# Patient Record
Sex: FEMALE | Race: WHITE | NOT HISPANIC OR LATINO
[De-identification: names, ages, dates, MRNs, and addresses within clinical notes are randomized per-mention and may not be internally consistent; named-entity substitution may affect disease eponyms.]

---

## 2021-08-02 ENCOUNTER — APPOINTMENT (OUTPATIENT)
Dept: GASTROENTEROLOGY | Facility: CLINIC | Age: 51
End: 2021-08-02

## 2023-07-11 PROBLEM — Z00.00 ENCOUNTER FOR PREVENTIVE HEALTH EXAMINATION: Status: ACTIVE | Noted: 2023-07-11

## 2023-07-18 ENCOUNTER — APPOINTMENT (OUTPATIENT)
Dept: ELECTROPHYSIOLOGY | Facility: CLINIC | Age: 53
End: 2023-07-18

## 2023-08-08 ENCOUNTER — APPOINTMENT (OUTPATIENT)
Dept: ELECTROPHYSIOLOGY | Facility: CLINIC | Age: 53
End: 2023-08-08
Payer: MEDICAID

## 2023-08-08 ENCOUNTER — NON-APPOINTMENT (OUTPATIENT)
Age: 53
End: 2023-08-08

## 2023-08-08 VITALS
HEART RATE: 75 BPM | DIASTOLIC BLOOD PRESSURE: 86 MMHG | HEIGHT: 63 IN | OXYGEN SATURATION: 99 % | SYSTOLIC BLOOD PRESSURE: 122 MMHG

## 2023-08-08 DIAGNOSIS — K21.9 GASTRO-ESOPHAGEAL REFLUX DISEASE W/OUT ESOPHAGITIS: ICD-10-CM

## 2023-08-08 DIAGNOSIS — Z78.9 OTHER SPECIFIED HEALTH STATUS: ICD-10-CM

## 2023-08-08 DIAGNOSIS — Z82.49 FAMILY HISTORY OF ISCHEMIC HEART DISEASE AND OTHER DISEASES OF THE CIRCULATORY SYSTEM: ICD-10-CM

## 2023-08-08 PROCEDURE — 99204 OFFICE O/P NEW MOD 45 MIN: CPT | Mod: 25

## 2023-08-08 PROCEDURE — 93000 ELECTROCARDIOGRAM COMPLETE: CPT

## 2023-08-08 RX ORDER — ASPIRIN 81 MG/1
81 TABLET, DELAYED RELEASE ORAL DAILY
Refills: 0 | Status: ACTIVE | COMMUNITY
Start: 2023-08-08

## 2023-08-08 RX ORDER — ZOLPIDEM TARTRATE 10 MG/1
10 TABLET, FILM COATED ORAL
Refills: 0 | Status: ACTIVE | COMMUNITY
Start: 2023-08-08

## 2023-08-08 RX ORDER — ROSUVASTATIN CALCIUM 20 MG/1
20 TABLET, FILM COATED ORAL DAILY
Qty: 90 | Refills: 3 | Status: ACTIVE | COMMUNITY
Start: 2023-08-08

## 2023-08-08 RX ORDER — FAMOTIDINE 40 MG/1
40 TABLET, FILM COATED ORAL
Refills: 0 | Status: ACTIVE | COMMUNITY
Start: 2023-08-08

## 2023-08-08 RX ORDER — PROPRANOLOL HYDROCHLORIDE 10 MG/1
10 TABLET ORAL
Refills: 0 | Status: DISCONTINUED | COMMUNITY
Start: 2023-08-08 | End: 2023-08-08

## 2023-08-08 NOTE — DISCUSSION/SUMMARY
[FreeTextEntry1] : 1. Palpitation: ECG performed today to check for arrhythmias, demonstrated PACs. Cardiac monitor also showed PACs.  With history of dizziness, would like to correlate with monitor if recurs.  Therefore 30-day monitor ordered. Patient had been treated with propranolol 10 mg 2 tablets twice daily for palpitations and migraines.  However, given palpitations and PACs, will try metoprolol 100 daily instead of propranolol. 30 day monitor also to ensure patient does not get too bradycardic on metoprolol and to check PAC frequency on treatment.   2. HTN: will use metoprolol for HTN for now.  [EKG obtained to assist in diagnosis and management of assessed problem(s)] : EKG obtained to assist in diagnosis and management of assessed problem(s)

## 2023-08-08 NOTE — HISTORY OF PRESENT ILLNESS
[FreeTextEntry1] : Ms. MARTELL ABAD is a 52-year woman with past medical history of HLD, GERD, HTN, who presents here for initial visit. Reports having palpitations for a while which gets more frequent now. She feels more when she eats, and when she lies in bed almost every day. She felt dizzy once about 3 months ago, did Cath in 5/26/2023 at [facility] and reportedly had nonobstructive CAD. Seen by Dr Quiles, wore Holter for 1 week and was told to follow up with EP. She was having palpitations while on the monitor which showed PACs. Echo was done in his office results not available now. Feeling well now. Denies chest pain, AGUILERA, syncope.

## 2023-08-22 ENCOUNTER — NON-APPOINTMENT (OUTPATIENT)
Age: 53
End: 2023-08-22

## 2023-08-22 ENCOUNTER — APPOINTMENT (OUTPATIENT)
Dept: ELECTROPHYSIOLOGY | Facility: CLINIC | Age: 53
End: 2023-08-22
Payer: MEDICAID

## 2023-08-22 VITALS
OXYGEN SATURATION: 97 % | BODY MASS INDEX: 26.93 KG/M2 | DIASTOLIC BLOOD PRESSURE: 86 MMHG | WEIGHT: 152 LBS | SYSTOLIC BLOOD PRESSURE: 128 MMHG | HEART RATE: 74 BPM | HEIGHT: 63 IN

## 2023-08-22 DIAGNOSIS — I10 ESSENTIAL (PRIMARY) HYPERTENSION: ICD-10-CM

## 2023-08-22 DIAGNOSIS — E78.5 HYPERLIPIDEMIA, UNSPECIFIED: ICD-10-CM

## 2023-08-22 PROCEDURE — 93000 ELECTROCARDIOGRAM COMPLETE: CPT

## 2023-08-22 PROCEDURE — 99213 OFFICE O/P EST LOW 20 MIN: CPT | Mod: 25

## 2023-08-22 NOTE — DISCUSSION/SUMMARY
[FreeTextEntry1] : 1. Palpitation: recurrent palpitations with noted APCs. On metoprolol. ECG performed today to assess for presence of conduction disease and reveals NSR with APC. Currently undergoing Holter monitor which has revealed apc's correlating with symptoms, no evidence of bradyarrhythmia's with metoprolol. Goven improvement in symptoms with use, will resume metoprolol 100mg daily.   2. HTN: resume oral antihypertensives as prescribed. Encouraged heart healthy diet, sodium restriction, and weight loss. Continue regular f/u with Cardiologist for further HTN management.  Will continue f/u with Cardiologist and may RTO as needed or if any new or worsening symptoms or findings occur. [EKG obtained to assist in diagnosis and management of assessed problem(s)] : EKG obtained to assist in diagnosis and management of assessed problem(s)

## 2023-08-22 NOTE — HISTORY OF PRESENT ILLNESS
[FreeTextEntry1] : Ms. MARTELL ABAD is a 52-year woman with past medical history of HLD, GERD, HTN, and recurrent palpitations who presents today for routine f/u. On initial visit, explained she was having palpitations frequent. She feels them more when she eats, and when she lies in bed almost every day. She felt dizzy once about 3 months ago, did Cath in 5/26/2023 at [facility] and reportedly had nonobstructive CAD. Seen by Dr Quiles, wore Holter for 1 week and was told to follow up with EP. She was having palpitations while on the monitor which showed PACs. She is now undergoing repeat Holter monitor with us which has revealed apc's but no sustained tachyarrhythmias correlating with symptoms. On metoprolol 100mg daily (previously on propranolol). Feels improvement in symptoms with use and denies any dizziness.

## 2023-09-11 ENCOUNTER — NON-APPOINTMENT (OUTPATIENT)
Age: 53
End: 2023-09-11

## 2023-09-11 DIAGNOSIS — R00.2 PALPITATIONS: ICD-10-CM

## 2023-10-04 ENCOUNTER — RX RENEWAL (OUTPATIENT)
Age: 53
End: 2023-10-04

## 2024-04-24 ENCOUNTER — RX RENEWAL (OUTPATIENT)
Age: 54
End: 2024-04-24

## 2024-04-24 RX ORDER — METOPROLOL SUCCINATE 100 MG/1
100 TABLET, EXTENDED RELEASE ORAL DAILY
Qty: 90 | Refills: 3 | Status: ACTIVE | COMMUNITY
Start: 2023-08-08 | End: 1900-01-01

## 2024-04-30 ENCOUNTER — APPOINTMENT (OUTPATIENT)
Dept: PULMONOLOGY | Facility: CLINIC | Age: 54
End: 2024-04-30
Payer: MEDICAID

## 2024-04-30 VITALS
HEART RATE: 79 BPM | OXYGEN SATURATION: 98 % | BODY MASS INDEX: 25.69 KG/M2 | SYSTOLIC BLOOD PRESSURE: 128 MMHG | HEIGHT: 63 IN | DIASTOLIC BLOOD PRESSURE: 85 MMHG | WEIGHT: 145 LBS | TEMPERATURE: 98.2 F

## 2024-04-30 DIAGNOSIS — Z87.891 PERSONAL HISTORY OF NICOTINE DEPENDENCE: ICD-10-CM

## 2024-04-30 DIAGNOSIS — R05.9 COUGH, UNSPECIFIED: ICD-10-CM

## 2024-04-30 DIAGNOSIS — F17.200 NICOTINE DEPENDENCE, UNSPECIFIED, UNCOMPLICATED: ICD-10-CM

## 2024-04-30 LAB — HEMOGLOBIN: 14.3

## 2024-04-30 PROCEDURE — 94727 GAS DIL/WSHOT DETER LNG VOL: CPT

## 2024-04-30 PROCEDURE — 94010 BREATHING CAPACITY TEST: CPT

## 2024-04-30 PROCEDURE — 85018 HEMOGLOBIN: CPT | Mod: QW

## 2024-04-30 PROCEDURE — 94729 DIFFUSING CAPACITY: CPT

## 2024-04-30 PROCEDURE — 99204 OFFICE O/P NEW MOD 45 MIN: CPT | Mod: 25

## 2024-04-30 NOTE — PROCEDURE
[FreeTextEntry1] : 4/2024 mild obstruction. normal volume and dlco  previous data reviewed: This patient has received 0 CT studies and 0 Myocardial Perfusion studies within our network over the previous 12 month period. HISTORY: Cough. Smoking history. TECHNIQUE: CT of the chest is performed. Evaluation is mildly limited by motion artifact. Contrast Technique: Without Range/Planes: [place] of lungs to the adrenal glands. Axial, coronal, and sagittal. One or more of the following dose reduction techniques were used: automated exposure control, adjustment of the mA and/or kV according to patient size, use of iterative reconstruction technique. COMPARISON: None FINDINGS: CHEST THYROID: Visualized portion is unremarkable. LUNGS: There is mild centrilobular emphysema. Mild scarring is present at bilateral lung apices. There are scattered bilateral predominantly upper lobe noncalcified nodules, the largest of which include a 0.3 cm right upper lobe nodule on series 8 image 85 and a 0.3 cm left upper lobe noncalcified nodule on series 8 image 54. TRACHEA AND BRONCHI: There is mild diffuse bronchial wall thickening. PLEURA: Unremarkable HEART AND PERICARDIUM: Unremarkable. VASCULATURE: Unremarkable. LYMPH NODES AND MEDIASTINUM: Unremarkable. SOFT TISSUES: A bilateral breast implants are present. The soft tissues are otherwise unremarkable. BONES: Unremarkable. FINDINGS: VISUALIZED PORTION OF THE ABDOMEN GASTROESOPHAGEAL JUNCTION: Unremarkable. OTHER ORGANS: Unremarkable. IMPRESSION: 1. Mild centrilobular emphysema. 2. Scattered bilateral small pulmonary nodules measuring no greater than 0.3 cm. According to Fleischner Society recommendations, no imaging follow-up is necessary if the patient is at low risk for pulmonary malignancy. If the patient is at high risk for pulmonary malignancy, single optional follow-up CT may be performed in 12 months. 3. Mild diffuse bronchial wall thickening, which may reflect bronchitis in the proper clinical setting. Thank you for the opportunity to participate in the care of this patient. CLAUDIO FOSS MD - Electronically Signed: 03- 3:17 P

## 2024-04-30 NOTE — HISTORY OF PRESENT ILLNESS
[Former] : former [>= 20 pack years] : >= 20 pack years [Current] : current [Never] : never [TextBox_4] : MARTELL ABAD is a 53 year old female who presents with abnormal ct PMH: HTN, insomna, GERD, former tobacco use, now vaping  had routine CT with Dr Quiles  some post nasadl rip now no wheezing no ER or UC visits  quit tobacco vapin gX 6 yaers not on any inhalers   [TextBox_11] : 1 [TextBox_13] : 20 [YearQuit] : 2018

## 2024-05-28 ENCOUNTER — APPOINTMENT (OUTPATIENT)
Dept: PULMONOLOGY | Facility: CLINIC | Age: 54
End: 2024-05-28
Payer: MEDICAID

## 2024-05-28 VITALS
HEIGHT: 63 IN | HEART RATE: 77 BPM | OXYGEN SATURATION: 99 % | SYSTOLIC BLOOD PRESSURE: 115 MMHG | BODY MASS INDEX: 25.69 KG/M2 | WEIGHT: 145 LBS | TEMPERATURE: 98.3 F | DIASTOLIC BLOOD PRESSURE: 79 MMHG

## 2024-05-28 DIAGNOSIS — J44.9 CHRONIC OBSTRUCTIVE PULMONARY DISEASE, UNSPECIFIED: ICD-10-CM

## 2024-05-28 PROCEDURE — 94010 BREATHING CAPACITY TEST: CPT

## 2024-05-28 PROCEDURE — 99214 OFFICE O/P EST MOD 30 MIN: CPT | Mod: 25

## 2024-05-28 RX ORDER — UMECLIDINIUM BROMIDE AND VILANTEROL TRIFENATATE 62.5; 25 UG/1; UG/1
62.5-25 POWDER RESPIRATORY (INHALATION)
Qty: 1 | Refills: 3 | Status: ACTIVE | COMMUNITY
Start: 2024-04-30 | End: 1900-01-01

## 2024-05-28 NOTE — HISTORY OF PRESENT ILLNESS
[Former] : former [>= 20 pack years] : >= 20 pack years [Current] : current [Never] : never [TextBox_4] : MARTELL ABAD is a 53 year old female who presents to f/u on anoro/ copd  PMH: HTN, insomna, GERD, former tobacco use, now vaping  had routine CT with Dr Quiles   quit tobacco vapin gX 6 yaers not on any inhalers  on anoro feels ok toleratin git  stilll vaping  [TextBox_11] : 1 [TextBox_13] : 20 [YearQuit] : 2018

## 2024-05-28 NOTE — PROCEDURE
[FreeTextEntry1] : 5/2024 obstruction on pfts unchanged  previous data reviewed:  4/2024 mild obstruction. normal volume and dlco  This patient has received 0 CT studies and 0 Myocardial Perfusion studies within our network over the previous 12 month period. HISTORY: Cough. Smoking history. TECHNIQUE: CT of the chest is performed. Evaluation is mildly limited by motion artifact. Contrast Technique: Without Range/Planes: [place] of lungs to the adrenal glands. Axial, coronal, and sagittal. One or more of the following dose reduction techniques were used: automated exposure control, adjustment of the mA and/or kV according to patient size, use of iterative reconstruction technique. COMPARISON: None FINDINGS: CHEST THYROID: Visualized portion is unremarkable. LUNGS: There is mild centrilobular emphysema. Mild scarring is present at bilateral lung apices. There are scattered bilateral predominantly upper lobe noncalcified nodules, the largest of which include a 0.3 cm right upper lobe nodule on series 8 image 85 and a 0.3 cm left upper lobe noncalcified nodule on series 8 image 54. TRACHEA AND BRONCHI: There is mild diffuse bronchial wall thickening. PLEURA: Unremarkable HEART AND PERICARDIUM: Unremarkable. VASCULATURE: Unremarkable. LYMPH NODES AND MEDIASTINUM: Unremarkable. SOFT TISSUES: A bilateral breast implants are present. The soft tissues are otherwise unremarkable. BONES: Unremarkable. FINDINGS: VISUALIZED PORTION OF THE ABDOMEN GASTROESOPHAGEAL JUNCTION: Unremarkable. OTHER ORGANS: Unremarkable. IMPRESSION: 1. Mild centrilobular emphysema. 2. Scattered bilateral small pulmonary nodules measuring no greater than 0.3 cm. According to Fleischner Society recommendations, no imaging follow-up is necessary if the patient is at low risk for pulmonary malignancy. If the patient is at high risk for pulmonary malignancy, single optional follow-up CT may be performed in 12 months. 3. Mild diffuse bronchial wall thickening, which may reflect bronchitis in the proper clinical setting. Thank you for the opportunity to participate in the care of this patient. CLAUDIO FOSS MD - Electronically Signed: 03- 3:17 P

## 2024-08-26 ENCOUNTER — APPOINTMENT (OUTPATIENT)
Dept: PULMONOLOGY | Facility: CLINIC | Age: 54
End: 2024-08-26

## 2024-12-16 ENCOUNTER — TRANSCRIPTION ENCOUNTER (OUTPATIENT)
Age: 54
End: 2024-12-16

## 2025-04-28 ENCOUNTER — EMERGENCY (EMERGENCY)
Facility: HOSPITAL | Age: 55
LOS: 1 days | End: 2025-04-28
Attending: STUDENT IN AN ORGANIZED HEALTH CARE EDUCATION/TRAINING PROGRAM
Payer: MEDICAID

## 2025-04-28 VITALS
TEMPERATURE: 99 F | DIASTOLIC BLOOD PRESSURE: 98 MMHG | HEIGHT: 63 IN | SYSTOLIC BLOOD PRESSURE: 148 MMHG | RESPIRATION RATE: 18 BRPM | WEIGHT: 134.92 LBS | HEART RATE: 81 BPM | OXYGEN SATURATION: 98 %

## 2025-04-28 LAB — GAS PNL BLDV: SIGNIFICANT CHANGE UP

## 2025-04-28 PROCEDURE — 99285 EMERGENCY DEPT VISIT HI MDM: CPT

## 2025-04-28 RX ORDER — ACETAMINOPHEN 500 MG/5ML
1000 LIQUID (ML) ORAL ONCE
Refills: 0 | Status: COMPLETED | OUTPATIENT
Start: 2025-04-28 | End: 2025-04-28

## 2025-04-28 RX ADMIN — Medication 400 MILLIGRAM(S): at 23:54

## 2025-04-28 RX ADMIN — Medication 80 MILLIGRAM(S): at 23:54

## 2025-04-28 NOTE — ED ADULT TRIAGE NOTE - CHIEF COMPLAINT QUOTE
c/o rectal bleeding x 1 day (bright red blood), diarrhea, dizziness and RLQ pain. Takes asa 81 mg daily.

## 2025-04-28 NOTE — ED ADULT NURSE NOTE - OBJECTIVE STATEMENT
pt is a 55yo female PMH HTN, HLD presenting to the ED complaining of rectal bleed. pt reports multiple episodes of bloody stools a/w intermittent RLQ pain and dizziness x1 day, reports blood is bright red in color, denies any hx of colitis or similar symptom presentation. pt also reports some intermittent nausea today, denies any vomiting. pt A&Ox3 gross neuro intact, no difficulty speaking in complete sentences, pulses x 4, phillips x4, abdomen soft nontender nondistended, skin intact. pt denies chest pain, sob, ha, abdominal pain, f/c, urinary symptoms, hematuria

## 2025-04-28 NOTE — ED ADULT TRIAGE NOTE - GLASGOW COMA SCALE: BEST MOTOR RESPONSE, MLM
(M6) obeys commands
Urine Pregnancy Test Result: negative
Detail Level: None
Performed By: Madi García CMA
Lot # (Optional): [id number]
Expiration Date (Optional): 2020-10-31

## 2025-04-29 VITALS
RESPIRATION RATE: 19 BRPM | DIASTOLIC BLOOD PRESSURE: 83 MMHG | TEMPERATURE: 98 F | SYSTOLIC BLOOD PRESSURE: 114 MMHG | OXYGEN SATURATION: 97 % | HEART RATE: 71 BPM

## 2025-04-29 LAB
ALBUMIN SERPL ELPH-MCNC: 3.9 G/DL — SIGNIFICANT CHANGE UP (ref 3.3–5)
ALP SERPL-CCNC: 57 U/L — SIGNIFICANT CHANGE UP (ref 40–120)
ALT FLD-CCNC: 18 U/L — SIGNIFICANT CHANGE UP (ref 10–45)
ANION GAP SERPL CALC-SCNC: 12 MMOL/L — SIGNIFICANT CHANGE UP (ref 5–17)
APPEARANCE UR: CLEAR — SIGNIFICANT CHANGE UP
APTT BLD: 28.6 SEC — SIGNIFICANT CHANGE UP (ref 26.1–36.8)
AST SERPL-CCNC: 20 U/L — SIGNIFICANT CHANGE UP (ref 10–40)
BASOPHILS # BLD AUTO: 0.07 K/UL — SIGNIFICANT CHANGE UP (ref 0–0.2)
BASOPHILS NFR BLD AUTO: 0.8 % — SIGNIFICANT CHANGE UP (ref 0–2)
BILIRUB SERPL-MCNC: 0.7 MG/DL — SIGNIFICANT CHANGE UP (ref 0.2–1.2)
BILIRUB UR-MCNC: NEGATIVE — SIGNIFICANT CHANGE UP
BLD GP AB SCN SERPL QL: NEGATIVE — SIGNIFICANT CHANGE UP
BUN SERPL-MCNC: 11 MG/DL — SIGNIFICANT CHANGE UP (ref 7–23)
CALCIUM SERPL-MCNC: 9.2 MG/DL — SIGNIFICANT CHANGE UP (ref 8.4–10.5)
CHLORIDE SERPL-SCNC: 106 MMOL/L — SIGNIFICANT CHANGE UP (ref 96–108)
CO2 SERPL-SCNC: 22 MMOL/L — SIGNIFICANT CHANGE UP (ref 22–31)
COLOR SPEC: YELLOW — SIGNIFICANT CHANGE UP
CREAT SERPL-MCNC: 0.58 MG/DL — SIGNIFICANT CHANGE UP (ref 0.5–1.3)
DIFF PNL FLD: NEGATIVE — SIGNIFICANT CHANGE UP
EGFR: 109 ML/MIN/1.73M2 — SIGNIFICANT CHANGE UP
EGFR: 109 ML/MIN/1.73M2 — SIGNIFICANT CHANGE UP
EOSINOPHIL # BLD AUTO: 0.55 K/UL — HIGH (ref 0–0.5)
EOSINOPHIL NFR BLD AUTO: 6 % — SIGNIFICANT CHANGE UP (ref 0–6)
GLUCOSE SERPL-MCNC: 104 MG/DL — HIGH (ref 70–99)
GLUCOSE UR QL: NEGATIVE MG/DL — SIGNIFICANT CHANGE UP
HCG SERPL-ACNC: <2 MIU/ML — SIGNIFICANT CHANGE UP
HCT VFR BLD CALC: 40.4 % — SIGNIFICANT CHANGE UP (ref 34.5–45)
HGB BLD-MCNC: 13.5 G/DL — SIGNIFICANT CHANGE UP (ref 11.5–15.5)
IMM GRANULOCYTES NFR BLD AUTO: 0.2 % — SIGNIFICANT CHANGE UP (ref 0–0.9)
INR BLD: 1.12 RATIO — SIGNIFICANT CHANGE UP (ref 0.85–1.16)
KETONES UR-MCNC: NEGATIVE MG/DL — SIGNIFICANT CHANGE UP
LEUKOCYTE ESTERASE UR-ACNC: NEGATIVE — SIGNIFICANT CHANGE UP
LIDOCAIN IGE QN: 21 U/L — SIGNIFICANT CHANGE UP (ref 7–60)
LYMPHOCYTES # BLD AUTO: 2 K/UL — SIGNIFICANT CHANGE UP (ref 1–3.3)
LYMPHOCYTES # BLD AUTO: 21.9 % — SIGNIFICANT CHANGE UP (ref 13–44)
MCHC RBC-ENTMCNC: 28.2 PG — SIGNIFICANT CHANGE UP (ref 27–34)
MCHC RBC-ENTMCNC: 33.4 G/DL — SIGNIFICANT CHANGE UP (ref 32–36)
MCV RBC AUTO: 84.5 FL — SIGNIFICANT CHANGE UP (ref 80–100)
MONOCYTES # BLD AUTO: 0.76 K/UL — SIGNIFICANT CHANGE UP (ref 0–0.9)
MONOCYTES NFR BLD AUTO: 8.3 % — SIGNIFICANT CHANGE UP (ref 2–14)
NEUTROPHILS # BLD AUTO: 5.75 K/UL — SIGNIFICANT CHANGE UP (ref 1.8–7.4)
NEUTROPHILS NFR BLD AUTO: 62.8 % — SIGNIFICANT CHANGE UP (ref 43–77)
NITRITE UR-MCNC: NEGATIVE — SIGNIFICANT CHANGE UP
NRBC BLD AUTO-RTO: 0 /100 WBCS — SIGNIFICANT CHANGE UP (ref 0–0)
OB PNL STL: POSITIVE
PH UR: 6 — SIGNIFICANT CHANGE UP (ref 5–8)
PLATELET # BLD AUTO: 237 K/UL — SIGNIFICANT CHANGE UP (ref 150–400)
POTASSIUM SERPL-MCNC: 3.5 MMOL/L — SIGNIFICANT CHANGE UP (ref 3.5–5.3)
POTASSIUM SERPL-SCNC: 3.5 MMOL/L — SIGNIFICANT CHANGE UP (ref 3.5–5.3)
PROT SERPL-MCNC: 6.7 G/DL — SIGNIFICANT CHANGE UP (ref 6–8.3)
PROT UR-MCNC: NEGATIVE MG/DL — SIGNIFICANT CHANGE UP
PROTHROM AB SERPL-ACNC: 12.8 SEC — SIGNIFICANT CHANGE UP (ref 9.9–13.4)
RBC # BLD: 4.78 M/UL — SIGNIFICANT CHANGE UP (ref 3.8–5.2)
RBC # FLD: 12.3 % — SIGNIFICANT CHANGE UP (ref 10.3–14.5)
RH IG SCN BLD-IMP: POSITIVE — SIGNIFICANT CHANGE UP
SODIUM SERPL-SCNC: 140 MMOL/L — SIGNIFICANT CHANGE UP (ref 135–145)
SP GR SPEC: <1.005 — LOW (ref 1–1.03)
UROBILINOGEN FLD QL: 0.2 MG/DL — SIGNIFICANT CHANGE UP (ref 0.2–1)
WBC # BLD: 9.15 K/UL — SIGNIFICANT CHANGE UP (ref 3.8–10.5)
WBC # FLD AUTO: 9.15 K/UL — SIGNIFICANT CHANGE UP (ref 3.8–10.5)

## 2025-04-29 PROCEDURE — 96365 THER/PROPH/DIAG IV INF INIT: CPT | Mod: XU

## 2025-04-29 PROCEDURE — 86901 BLOOD TYPING SEROLOGIC RH(D): CPT

## 2025-04-29 PROCEDURE — 99284 EMERGENCY DEPT VISIT MOD MDM: CPT | Mod: 25

## 2025-04-29 PROCEDURE — 80053 COMPREHEN METABOLIC PANEL: CPT

## 2025-04-29 PROCEDURE — 85610 PROTHROMBIN TIME: CPT

## 2025-04-29 PROCEDURE — 85018 HEMOGLOBIN: CPT

## 2025-04-29 PROCEDURE — 84702 CHORIONIC GONADOTROPIN TEST: CPT

## 2025-04-29 PROCEDURE — 81003 URINALYSIS AUTO W/O SCOPE: CPT

## 2025-04-29 PROCEDURE — 83605 ASSAY OF LACTIC ACID: CPT

## 2025-04-29 PROCEDURE — 74177 CT ABD & PELVIS W/CONTRAST: CPT | Mod: 26

## 2025-04-29 PROCEDURE — 82947 ASSAY GLUCOSE BLOOD QUANT: CPT

## 2025-04-29 PROCEDURE — 74177 CT ABD & PELVIS W/CONTRAST: CPT | Mod: MC

## 2025-04-29 PROCEDURE — 82803 BLOOD GASES ANY COMBINATION: CPT

## 2025-04-29 PROCEDURE — 86900 BLOOD TYPING SEROLOGIC ABO: CPT

## 2025-04-29 PROCEDURE — 85730 THROMBOPLASTIN TIME PARTIAL: CPT

## 2025-04-29 PROCEDURE — 82272 OCCULT BLD FECES 1-3 TESTS: CPT

## 2025-04-29 PROCEDURE — 86850 RBC ANTIBODY SCREEN: CPT

## 2025-04-29 PROCEDURE — 82435 ASSAY OF BLOOD CHLORIDE: CPT

## 2025-04-29 PROCEDURE — 84132 ASSAY OF SERUM POTASSIUM: CPT

## 2025-04-29 PROCEDURE — 85025 COMPLETE CBC W/AUTO DIFF WBC: CPT

## 2025-04-29 PROCEDURE — 83690 ASSAY OF LIPASE: CPT

## 2025-04-29 PROCEDURE — 83735 ASSAY OF MAGNESIUM: CPT

## 2025-04-29 PROCEDURE — 84295 ASSAY OF SERUM SODIUM: CPT

## 2025-04-29 PROCEDURE — 96375 TX/PRO/DX INJ NEW DRUG ADDON: CPT

## 2025-04-29 PROCEDURE — 82330 ASSAY OF CALCIUM: CPT

## 2025-04-29 PROCEDURE — 85014 HEMATOCRIT: CPT

## 2025-04-29 PROCEDURE — 36415 COLL VENOUS BLD VENIPUNCTURE: CPT

## 2025-04-29 RX ADMIN — Medication 1000 MILLIGRAM(S): at 00:23

## 2025-04-29 NOTE — ED PROVIDER NOTE - PATIENT PORTAL LINK FT
You can access the FollowMyHealth Patient Portal offered by [facility] by registering at the following website: [email or web address]. By joining [employer]’s FollowMyHealth portal, you will also be able to view your health information using other applications (apps) compatible with our system.

## 2025-04-29 NOTE — ED PROVIDER NOTE - NSFOLLOWUPINSTRUCTIONS_ED_ALL_ED_FT
You have been evaluated in the Emergency Department today for bloody stool. Your evaluation showed that you likely have colitis.    Please schedule an appointment with your primary care physician, within 48-72 hours if possible.    For pain, you can take TYLENOL/ACETAMINOPHEN up to 4,000mg a day for your symptoms in four divided doses.     Return to the Emergency Department if you experience worsening bloody stool, uncontrolled pain, fevers 100.4°F or greater, recurrent vomiting, inability to tolerate food or fluids by mouth, bloody stools or vomit, black or tarry stools, or any other concerning symptoms.    Thank you for choosing us for your care. You have been evaluated in the Emergency Department today for bloody stool. Your evaluation showed that you likely have colitis.    Please schedule an appointment with your GI doctor within 2-3 days.     For pain, you can take TYLENOL/ACETAMINOPHEN up to 4,000mg a day for your symptoms in four divided doses.     Return to the Emergency Department if you experience worsening bloody stool, uncontrolled pain, fevers 100.4°F or greater, recurrent vomiting, inability to tolerate food or fluids by mouth, bloody stools or vomit, black or tarry stools, or any other concerning symptoms.    Thank you for choosing us for your care.

## 2025-04-29 NOTE — ED PROVIDER NOTE - ATTENDING CONTRIBUTION TO CARE
William Holliday MD (Attending Physician):    I performed a history and physical exam of the patient and discussed their management with the resident/fellow/ACP/student. I have reviewed the resident/fellow/ACP/student note and agree with the documented findings and plan of care, except as noted. I have personally performed a substantive portion of the visit including all aspects of the medical decision making. My medical decision making and observations are found below. Please refer to any progress notes for updates on clinical course.    HPI:  53yo female with past medical history of hypertension, uterine cancer status post resection, on 81 mg aspirin daily presenting with bright red blood per rectum. Patient says that she has had 2 episodes of bloody stool over the past day. Also endorsing some right lower abdominal pain but patient says that is chronic. Had a colonoscopy showing benign polyps 3 years ago. Denies fever, lightheadedness, chest pain, shortness of breath, urinary symptoms, diarrhea.    PE:  Vitals noted  GEN - NAD, well appearing, A&Ox3  HEAD - NC/AT  EYES - PERRL, EOMI  ENT - Airway patent, mucous membranes moist  PULMONARY - Normal wob, CTA b/l, symmetric breath sounds, no W/R/R, satting 98% on RA  CARDIAC - +S1S2, RRR, no M/G/R, no JVD  ABDOMEN - +BS, ND, +TTP in RLQ, soft, no guarding, no rebound, no masses, no rigidity  RECTAL - Rectal exam chaperoned by Dr. Suzi Lr. +External anal tissue tag, no external hemorrhoid. +AB without any obvious internal hemorrhoids, but scant amount of hematochezia seen.   - No CVA TTP b/l  EXTREMITIES - FROM, symmetric pulses, no edema  SKIN - No rash or bruising  NEUROLOGIC - Alert, speech clear, moving all extremities spontaneously, no focal deficits  PSYCH - Normal mood/affect, normal insight    MDM:  DDx includes, but not limited to: colitis, diverticulitis, appendicitis, kidney stone, UTI, pancreatitis, anemia. CT a/p, labs, protonix, tylenol. Dispo pending w/u.

## 2025-04-29 NOTE — ED PROVIDER NOTE - PROGRESS NOTE DETAILS
Suzi Lr M.D. (Resident Physician): CT shows concern for colitis. Lucien preciado. Will dc with GI f/u.

## 2025-04-29 NOTE — ED PROVIDER NOTE - CLINICAL SUMMARY MEDICAL DECISION MAKING FREE TEXT BOX
54 female past medical history hypertension, uterine cancer status post resection, on 81 mg aspirin daily presenting with bright red blood per rectum.  Patient says that she has had 2 episodes of bloody stool over the past day.  Also endorsing some right lower abdominal pain but patient says that is chronic.  Had a colonoscopy showing benign polyps 3 years ago.  Denies lightheadedness, chest pain, shortness of breath, urinary symptoms, diarrhea.  Vitals nonactionable.  On exam: Patient sitting comfortable in bed in no acute distress.  Heart regular rate.  Lungs with symmetric chest expansion bilaterally.  Abdomen soft with mild tenderness to palpation in the right abdomen.  Rectal exam with Dr. Holliday shows tissue tag and some blood mixed with stool, no hemorrhoids/fissures.  Differential diagnosis includes but not limited to: Colitis, diverticulitis.  Plan: Blood work, CT abdomen pelvis, pain control.  Will reassess.  Likely discharge.